# Patient Record
Sex: FEMALE | Race: WHITE
[De-identification: names, ages, dates, MRNs, and addresses within clinical notes are randomized per-mention and may not be internally consistent; named-entity substitution may affect disease eponyms.]

---

## 2019-07-31 ENCOUNTER — HOSPITAL ENCOUNTER (INPATIENT)
Dept: HOSPITAL 36 - ER | Age: 54
LOS: 2 days | Discharge: HOME | DRG: 249 | End: 2019-08-02
Attending: FAMILY MEDICINE | Admitting: FAMILY MEDICINE
Payer: COMMERCIAL

## 2019-07-31 VITALS — DIASTOLIC BLOOD PRESSURE: 67 MMHG | SYSTOLIC BLOOD PRESSURE: 122 MMHG

## 2019-07-31 DIAGNOSIS — E87.6: ICD-10-CM

## 2019-07-31 DIAGNOSIS — K56.600: ICD-10-CM

## 2019-07-31 DIAGNOSIS — E87.8: ICD-10-CM

## 2019-07-31 DIAGNOSIS — K70.9: ICD-10-CM

## 2019-07-31 DIAGNOSIS — F10.20: ICD-10-CM

## 2019-07-31 DIAGNOSIS — F32.9: ICD-10-CM

## 2019-07-31 DIAGNOSIS — K52.9: Primary | ICD-10-CM

## 2019-07-31 DIAGNOSIS — D64.9: ICD-10-CM

## 2019-07-31 DIAGNOSIS — N39.0: ICD-10-CM

## 2019-07-31 DIAGNOSIS — K74.60: ICD-10-CM

## 2019-07-31 DIAGNOSIS — F29: ICD-10-CM

## 2019-07-31 LAB
ALBUMIN SERPL-MCNC: 3.5 GM/DL (ref 3.7–5.3)
ALBUMIN/GLOB SERPL: 0.8 {RATIO} (ref 1–1.8)
ALP SERPL-CCNC: 147 U/L (ref 34–104)
ALT SERPL-CCNC: 23 U/L (ref 7–52)
AMPHET UR-MCNC: NEGATIVE NG/ML
AMYLASE SERPL-CCNC: 55 U/L (ref 29–103)
ANION GAP SERPL CALC-SCNC: 12.8 MMOL/L (ref 7–16)
APPEARANCE UR: CLEAR
AST SERPL-CCNC: 42 U/L (ref 13–39)
BACTERIA #/AREA URNS HPF: (no result) /HPF
BARBITURATES UR-MCNC: NEGATIVE UG/ML
BASOPHILS # BLD AUTO: 0 TH/CUMM (ref 0–0.2)
BASOPHILS NFR BLD: 0 % (ref 0–3)
BENZODIAZEPINES PNL UR: NEGATIVE
BILIRUB SERPL-MCNC: 1.8 MG/DL (ref 0.3–1)
BILIRUB UR-MCNC: NEGATIVE MG/DL
BUN SERPL-MCNC: 10 MG/DL (ref 7–25)
CALCIUM SERPL-MCNC: 9.3 MG/DL (ref 8.6–10.3)
CANNABINOIDS SERPL QL CFM: POSITIVE
CHLORIDE SERPL-SCNC: 101 MEQ/L (ref 98–107)
CHOLEST SERPL-MCNC: 94 MG/DL (ref ?–200)
CK SERPL-CCNC: 145 U/L (ref 30–223)
CO2 SERPL-SCNC: 25.3 MEQ/L (ref 21–31)
COCAINE METAB.OTHER UR-MCNC: NEGATIVE NG/ML
COLOR UR: YELLOW
CREAT SERPL-MCNC: 0.6 MG/DL (ref 0.6–1.2)
EOSINOPHIL # BLD AUTO: 0.1 TH/CMM (ref 0.1–0.4)
EOSINOPHIL NFR BLD: 1 % (ref 0–5)
EPI CELLS URNS QL MICRO: (no result) /LPF
ERYTHROCYTE [DISTWIDTH] IN BLOOD BY AUTOMATED COUNT: 17.3 % (ref 11.5–20)
GLOBULIN SER-MCNC: 4.5 GM/DL
GLUCOSE SERPL-MCNC: 133 MG/DL (ref 70–105)
GLUCOSE UR STRIP-MCNC: NEGATIVE MG/DL
HCT VFR BLD CALC: 36.5 % (ref 41–60)
HDLC SERPL-MCNC: 31 MG/DL (ref 23–92)
HGB BLD-MCNC: 11.5 GM/DL (ref 12–16)
INR PPP: 1.18 (ref 0.5–1.4)
KETONES UR STRIP-MCNC: NEGATIVE MG/DL
LEUKOCYTE ESTERASE UR-ACNC: (no result)
LIPASE SERPL-CCNC: 88 U/L (ref 11–82)
LYMPHOCYTE AB SER FC-ACNC: 0.9 TH/CMM (ref 1.5–3)
LYMPHOCYTES # BLD MANUAL: 26 % (ref 20–50)
MCH RBC QN AUTO: 23.6 PG (ref 27–31)
MCHC RBC AUTO-ENTMCNC: 31.5 PG (ref 28–36)
MCV RBC AUTO: 75 FL (ref 81–100)
METHADONE UR CFM-MCNC: NEGATIVE NG/ML
METHAMPHET UR QL: NEGATIVE
MICRO URNS: YES
MONOCYTES # BLD AUTO: 0.6 TH/CMM (ref 0.3–1)
MONOCYTES # BLD MANUAL: 14 % (ref 2–10)
NEUTROPHILS # BLD: 2.3 TH/CMM (ref 1.8–8)
NEUTROPHILS NFR BLD AUTO: 59 % (ref 40–80)
NEUTS BAND NFR BLD: 0 % (ref 0–10)
NITRITE UR QL STRIP: NEGATIVE
OPIATES UR QL: NEGATIVE
PCP UR-MCNC: NEGATIVE UG/L
PH UR STRIP: 8 [PH] (ref 4.6–8)
PLATELET # BLD: 210 TH/CMM (ref 150–400)
POTASSIUM SERPL-SCNC: 2.1 MEQ/L (ref 3.5–5.1)
PROT UR STRIP-MCNC: NEGATIVE MG/DL
RBC # BLD AUTO: 4.87 MIL/CMM (ref 3.8–5.1)
RBC # UR STRIP: NEGATIVE /UL
RBC #/AREA URNS HPF: (no result) /HPF (ref 0–5)
SODIUM SERPL-SCNC: 137 MEQ/L (ref 136–145)
SP GR UR STRIP: <= 1.005 (ref 1–1.03)
TRICYCLICS UR QL: NEGATIVE
TRIGL SERPL-MCNC: 44 MG/DL (ref ?–150)
URINALYSIS COMPLETE PNL UR: (no result)
UROBILINOGEN UR STRIP-ACNC: 1 E.U./DL (ref 0.2–1)
WBC # BLD AUTO: 3.9 TH/CMM (ref 4.8–10.8)
WBC #/AREA URNS HPF: (no result) /HPF (ref 0–5)

## 2019-07-31 PROCEDURE — C9113 INJ PANTOPRAZOLE SODIUM, VIA: HCPCS

## 2019-07-31 RX ADMIN — POTASSIUM CHLORIDE SCH MLS/HR: 14.9 INJECTION, SOLUTION INTRAVENOUS at 17:07

## 2019-07-31 RX ADMIN — POTASSIUM CHLORIDE SCH MLS/HR: 14.9 INJECTION, SOLUTION INTRAVENOUS at 21:35

## 2019-07-31 RX ADMIN — METRONIDAZOLE SCH MLS/HR: 500 INJECTION, SOLUTION INTRAVENOUS at 21:31

## 2019-07-31 RX ADMIN — POTASSIUM CHLORIDE, DEXTROSE MONOHYDRATE AND SODIUM CHLORIDE SCH MLS/HR: 150; 5; 450 INJECTION, SOLUTION INTRAVENOUS at 17:05

## 2019-07-31 NOTE — DIAGNOSTIC IMAGING REPORT
CT abdomen and pelvis with intravenous contrast



Indication: Abdominal pain, rule out appendicitis



Comparison: None,



Technique: Axial images were obtained from the lung bases to the

bilateral proximal femurs with IV contrast.  Coronal reconstructions

were made.  total DLP:     454,  CTDI9.5



FINDINGS: Exam is limited due to motion.



Hypoventilatory atelectatic changes of the lung bases are noted. 

Heterogeneous liver is noted with probable early cirrhotic changes. 

Subcentimeter low-density focus in the dome of liver is noted to small

too characterize.  The patient is status post cholecystectomy.  The

spleen measures 14 cm.  No focal lesions.  No focal pancreatic or

adrenal lesions.  



There is a 1 cm right renal cyst and additional low-density lesions too

small to characterize but suggestive of cysts.  Distended urinary

bladder is noted.  There is a copious stool throughout the course. 

There is slight diastases of the midline anterior abdominal wall with

broad-based mild protrusion of bowel loops.  Partially visualized

nondilated appendix is noted without evidence of appendicitis. 

Fluid-filled loops of small bowel are noted with air-fluid levels.  No

free fluid or free air.  Minimal atherosclerosis is noted.  Advanced

degenerative changes lower lumbar spine is noted.  There appears to have

been old right pubic bone fracture.



IMPRESSION:



Limited exam due to motion.  Partially visualized nondilated appendix is

noted.  No evidence of acute appendicitis.



Multiple fluid-filled loops of small bowel a few small air-fluid levels.

 Findings may be due to inflammatory infectious process/enteritis.



Distended urinary bladder.



Slight diastases of the midline anterior abdominal wall with slight

broad-based protrusion of bowel loops.



Irregularity and likely early cirrhotic changes of the liver, correlate

clinically.



Mild splenomegaly.  



Evidence of prior cholecystectomy.



Subcentimeter low-density lesion of the dome of liver too small to

characterize.



There appears to be have been old right pubic bone fracture.  Assessment

was limited due to motion.

## 2019-08-01 LAB
ALBUMIN SERPL-MCNC: 3 GM/DL (ref 3.7–5.3)
ALBUMIN/GLOB SERPL: 0.8 {RATIO} (ref 1–1.8)
ALP SERPL-CCNC: 122 U/L (ref 34–104)
ALT SERPL-CCNC: 19 U/L (ref 7–52)
ANION GAP SERPL CALC-SCNC: 10.4 MMOL/L (ref 7–16)
AST SERPL-CCNC: 34 U/L (ref 13–39)
BILIRUB SERPL-MCNC: 1.4 MG/DL (ref 0.3–1)
BUN SERPL-MCNC: 9 MG/DL (ref 7–25)
CALCIUM SERPL-MCNC: 9.2 MG/DL (ref 8.6–10.3)
CHLORIDE SERPL-SCNC: 112 MEQ/L (ref 98–107)
CO2 SERPL-SCNC: 22.2 MEQ/L (ref 21–31)
CREAT SERPL-MCNC: 0.5 MG/DL (ref 0.6–1.2)
GLOBULIN SER-MCNC: 3.9 GM/DL
GLUCOSE SERPL-MCNC: 113 MG/DL (ref 70–105)
MAGNESIUM SERPL-MCNC: 1.7 MG/DL (ref 1.9–2.7)
POTASSIUM SERPL-SCNC: 2.6 MEQ/L (ref 3.5–5.1)
SODIUM SERPL-SCNC: 142 MEQ/L (ref 136–145)

## 2019-08-01 RX ADMIN — METRONIDAZOLE SCH MLS/HR: 500 INJECTION, SOLUTION INTRAVENOUS at 05:29

## 2019-08-01 RX ADMIN — LACTULOSE SCH GM: 20 SOLUTION ORAL at 09:06

## 2019-08-01 RX ADMIN — LACTULOSE SCH GM: 20 SOLUTION ORAL at 12:59

## 2019-08-01 RX ADMIN — POTASSIUM CHLORIDE, DEXTROSE MONOHYDRATE AND SODIUM CHLORIDE SCH MLS/HR: 150; 5; 450 INJECTION, SOLUTION INTRAVENOUS at 16:25

## 2019-08-01 RX ADMIN — LACTULOSE SCH: 20 SOLUTION ORAL at 16:23

## 2019-08-01 RX ADMIN — POTASSIUM CHLORIDE, DEXTROSE MONOHYDRATE AND SODIUM CHLORIDE SCH MLS/HR: 150; 5; 450 INJECTION, SOLUTION INTRAVENOUS at 05:54

## 2019-08-01 RX ADMIN — METRONIDAZOLE SCH MLS/HR: 500 INJECTION, SOLUTION INTRAVENOUS at 21:03

## 2019-08-01 RX ADMIN — METRONIDAZOLE SCH MLS/HR: 500 INJECTION, SOLUTION INTRAVENOUS at 12:58

## 2019-08-01 RX ADMIN — LACTULOSE SCH GM: 20 SOLUTION ORAL at 21:03

## 2019-08-01 NOTE — INTERNAL MEDICINE PROG NOTE
Internal Medicine Subjective





- Subjective


Service Date: 19


Patient seen and examined:: with staff (SHE FEELS BETTER)


Patient is:: awake, verbal, in bed, talking, confused


Per staff patient has:: no adverse event





Internal Medicine Objective





- Results


Result Diagrams: 


 19 11:30





 19 16:00


Recent Labs: 


 Laboratory Last Values











WBC  3.9 Th/cmm (4.8-10.8)  L  19  11:30    


 


RBC  4.87 Mil/cmm (3.80-5.10)   19  11:30    


 


Hgb  11.5 gm/dL (12-16)  L  19  11:30    


 


Hct  36.5 % (41.0-60)  L  19  11:30    


 


MCV  75.0 fl ()  L  19  11:30    


 


MCH  23.6 pg (27.0-31.0)  L  19  11:30    


 


MCHC Differential  31.5 pg (28.0-36.0)   19  11:30    


 


RDW  17.3 % (11.5-20.0)   19  11:30    


 


Plt Count  210 Th/cmm (150-400)   19  11:30    


 


MPV  9.4 fl  19  11:30    


 


Add Manual Diff  YES   19  11:30    


 


Band Neutrophils %  0 % (0-10)   19  11:30    


 


Neutrophils (Manual)  59 % (40-80)   19  11:30    


 


Lymphocytes  26 % (20-50)   19  11:30    


 


Monocytes  14 % (2-10)  H  19  11:30    


 


Eosinophils  1 % (0-5)   19  11:30    


 


Basophils  0 % (0-3)   19  11:30    


 


Microcytosis  2+   19  11:30    


 


PT  12.2 SECONDS (9.5-11.5)  H  19  11:30    


 


INR  1.18  (0.5-1.4)   19  11:30    


 


Sodium  142 mEq/L (136-145)   19  05:20    


 


Potassium  3.3 mEq/L (3.5-5.1)  L  19  16:00    


 


Chloride  112 mEq/L ()  H  19  05:20    


 


Carbon Dioxide  22.2 mEq/L (21.0-31.0)   19  05:20    


 


Anion Gap  10.4  (7.0-16.0)   19  05:20    


 


BUN  9 mg/dL (7-25)   19  05:20    


 


Creatinine  0.5 mg/dL (0.6-1.2)  L  19  05:20    


 


Est GFR ( Amer)  > 60.0 ml/min (>90)   19  05:20    


 


Est GFR (Non-Af Amer)  > 60.0 ml/min  19  05:20    


 


BUN/Creatinine Ratio  18.0   19  05:20    


 


Glucose  113 mg/dL ()  H  19  05:20    


 


Whole Bld Lactic Acid  1.50 mmol/L (0.60-1.99)   19  11:30    


 


Calcium  9.2 mg/dL (8.6-10.3)   19  05:20    


 


Magnesium  1.7 mg/dL (1.9-2.7)  L  19  05:20    


 


Total Bilirubin  1.4 mg/dL (0.3-1.0)  H  19  05:20    


 


AST  34 U/L (13-39)   19  05:20    


 


ALT  19 U/L (7-52)   19  05:20    


 


Alkaline Phosphatase  122 U/L ()  H  19  05:20    


 


Ammonia  192 umol/L (16-53)  H  19  05:20    


 


Creatine Kinase  145 U/L ()   19  11:30    


 


Troponin I  < 0.01 ng/mL (0.01-0.05)  L  19  11:30    


 


B-Natriuretic Peptide  28.3 pg/mL (5.0-100.0)   19  11:30    


 


Total Protein  6.9 gm/dL (6.0-8.3)   19  05:20    


 


Albumin  3.0 gm/dL (3.7-5.3)  L  19  05:20    


 


Globulin  3.9 gm/dL  19  05:20    


 


Albumin/Globulin Ratio  0.8  (1.0-1.8)  L  19  05:20    


 


Triglycerides  44 mg/dL (<150)   19  11:30    


 


Cholesterol  94 mg/dL (<200)   19  11:30    


 


LDL Cholesterol Direct  64 mg/dL ()  L  19  11:30    


 


HDL Cholesterol  31 mg/dL (23-92)   19  11:30    


 


Amylase  55 U/L ()   19  11:30    


 


Lipase  88 U/L (11-82)  H  19  11:30    


 


Serum Pregnancy, Qual  NEGATIVE  (NEGATIVE)   19  11:30    


 


Urine Source  CLEAN C   19  13:30    


 


Urine Color  YELLOW   19  13:30    


 


Urine Clarity  CLEAR  (CLEAR)   19  13:30    


 


Urine pH  8.0  (4.6 - 8.0)   19  13:30    


 


Ur Specific Gravity  <= 1.005  (1.005-1.030)   19  13:30    


 


Urine Protein  NEGATIVE mg/dL (NEGATIVE)   19  13:30    


 


Urine Glucose (UA)  NEGATIVE mg/dL (NEGATIVE)   19  13:30    


 


Urine Ketones  NEGATIVE mg/dL (NEGATIVE)   19  13:30    


 


Urine Blood  NEGATIVE  (NEGATIVE)   19  13:30    


 


Urine Nitrate  NEGATIVE  (NEGATIVE)   19  13:30    


 


Urine Bilirubin  NEGATIVE  (NEGATIVE)   19  13:30    


 


Urine Urobilinogen  1.0 E.U./dL (0.2 - 1.0)   19  13:30    


 


Ur Leukocyte Esterase  SMALL  (NEGATIVE)  H  19  13:30    


 


Urine RBC  0-2 /hpf (0-5)   19  13:30    


 


Urine WBC  2-5 /hpf (0-5)   19  13:30    


 


Ur Epithelial Cells  FEW /lpf (FEW)   19  13:30    


 


Urine Bacteria  FEW /hpf (NONE SEEN)   19  13:30    


 


Urine Opiates Screen  NEGATIVE  (NEGATIVE)   19  13:30    


 


Urine Methadone Screen  NEGATIVE  (NEGATIVE)   19  13:30    


 


Ur Barbiturates Screen  NEGATIVE  (NEGATIVE)   19  13:30    


 


Ur Tricyclics Screen  NEGATIVE  (NEGATIVE)   19  13:30    


 


Ur Phencyclidine Scrn  NEGATIVE  (NEGATIVE)   19  13:30    


 


Amphetamines Screen  NEGATIVE  (NEGATIVE)   19  13:30    


 


U Methamphetamines Scrn  NEGATIVE  (NEGATIVE)   19  13:30    


 


U Benzodiazepines Scrn  NEGATIVE  (NEGATIVE)   19  13:30    


 


U Cocaine Metab Screen  NEGATIVE  (NEGATIVE)   19  13:30    


 


U Cannabinoids Screen  POSITIVE  (NEGATIVE)  H  19  13:30    


 


Ethyl Alcohol  < 10 mg/dL (0-10)   19  11:30    














- Physical Exam


Vitals and I&O: 


 Vital Signs











Temp  98.8 F   19 16:00


 


Pulse  87   19 16:00


 


Resp  20   19 16:00


 


BP  127/75   19 16:00


 


Pulse Ox  97   19 16:00








 Intake & Output











 19





 18:59 06:59 18:59


 


Intake Total 200 2500 1100


 


Balance 200 2500 1100


 


Weight (lbs) 72.575 kg 72.575 kg 72.575 kg


 


Intake:   


 


  Intake, IV Amount  1300 1100


 


    D5-0.45NS w/20 mEq KCL 1,  1000 1000





    000 ml @ 100 mls/hr IV .   





    Q10H SYLWIA Rx#:[id number]   


 


    KCL 20mEq/100mL Premix 20  100 





    meq In 100 ml @ 50 mls/   





    hr IV Q2H SYLWIA Rx#:   





    [id number]   


 


    metroNIDAZOLE 500mg/NS  200 100





    100mL 500 mg In 100 ml @   





    100 mls/hr IV Q8H SYLWIA Rx#   





    :[id number]   


 


  Oral 200 1200 


 


Other:   


 


  # Voids 1 5 


 


  # Bowel Movements  2 


 


  Stool Characteristics  Soft Soft





  Formed Formed


 


  Weight Source Bedscale Patient stated Patient stated











Active Medications: 


Current Medications





Potassium Chloride/Dextrose/Sod Cl (D5-0.45ns W/20 Meq Kcl)  1,000 mls @ 100 mls

/hr IV .Q10H SYLWIA


   Stop: 19 16:29


   Last Admin: 19 16:25 Dose:  100 mls/hr


Metronidazole (Flagyl)  500 mg in 100 mls @ 100 mls/hr IV Q8H SYLWIA


   Stop: 19 20:59


   Last Infusion: 19 13:55 Dose:  Infused


Lactulose (Cephulac)  15 gm PO QID SYLWIA


   Stop: 19 08:59


   Last Admin: 19 16:23 Dose:  Not Given


Lorazepam (Ativan)  1 mg PO Q4HR PRN; Protocol


   PRN Reason: Anxiety


   Stop: 19 06:31


Metoprolol Tartrate (Lopressor)  12.5 mg PO DAILY SYLWIA


   Stop: 19 08:59


   Last Admin: 19 09:07 Dose:  Not Given


Mupirocin (Bactroban Oint)  1 appl NS BID SYLWIA


   Stop: 19 09:01


Pantoprazole Sodium (Protonix)  40 mg IVP DAILY SYLWIA


   Stop: 19 08:59


   Last Admin: 19 09:02 Dose:  40 mg


Potassium Chloride (Klor-Con)  40 meq PO X1 ONE


   Stop: 19 17:11


Spironolactone (Aldactone)  100 mg PO DAILY SYLWIA


   Stop: 19 08:59


   Last Admin: 19 09:06 Dose:  Not Given








General: alert


HEENT: NC/AT, PERRLA, EOMI, anicteric sclerae, throat clear


Neck: Supple, No JVD, No thyromegaly, +2 carotid pulse wo bruit, No LAD


Cardiovascular: RRR, Normal S1, Normal S2, without murmur


Abdomen: non-tender, non-distended


Extremities: clear


Neurological: no change





Internal Medicine Assmt/Plan





- Assessment


Assessment: 





1.ACUTE GASTROENTRITIS.


2.ALCOHOL LIVER DISEASE.


3.PSYCHOSIS.


4.ELECTROLYTE IMBALANCE.





- Plan


Plan: 





CONTINUE ON CURRENT MEDICATION AND DIET.





Nutritional Asmnt/Malnutr-PDOC





- Dietary Evaluation


Malnutrition Findings (Please click <Entered> for more info): 








Nutritional Asmnt/Malnutrition                             Start:  19 14:

17


Text:                                                      Status: Complete    

  


Freq:                                                                          

  


Protocol:                                                                      

  


 Document     19 14:17  JOSEPH  (Rec: 19 14:23  JOSEPH [id number])


 Nutritional Asmnt/Malnutrition


     Patient General Information


      Nutritional Screening                      High Risk


      Diagnosis                                  Gastroenteritis


      Pertinent Medical Hx/Surgical Hx           HTN, PUD/GERD, Cirrhosis,


                                                 Alcohol liver disease,


                                                 Cholecystectomy


      Subjective Information                     Pt is a 53-year-old female


                                                 admitted on  c/o


                                                 intermittent, dull, diffuse,


                                                 crampy, abdominal pain, N/V/D


                                                 x 1 day. Pt PO intake 75%


                                                 dinner yesterday  and 0%


                                                 breakfast today per Meal/


                                                 Nutrition Activity Record.


                                                 Per RN Tuyet, N/V/Abdominal


                                                 Pain improved. Spoke to JOSE Lala, Pt slept through


                                                 breakfast but pt ate 25% lunch


                                                 today. Will continue monitor


                                                 on PO intake to nutrition


                                                 needs.


                                                 HT: 53


                                                 WT: 160 LB (72.73 kg)


                                                 ADJ BW: 60.5 kg


                                                 BMI: 28.34 (Overweight)


                                                 GI: WNL, Soft, Large


                                                 BM: 8/1 x 2


                                                 I/O: 2600/Not Noted


                                                 Skin: WNL, Pink, Intact


                                                 Mikal: 22


                                                 Diet Order: Clear liquid


                                                 Estimated Energy Needs: (


                                                 Overweight, ADJ BW)


                                                 8127-7501 kcals (20-25 kcals/


                                                 kg)


                                                 48-54 g Pro (0.8-0.9 g/kg)


                                                 5653-0716 ml (25-30 ml/kg)


      Current Diet Order/ Nutrition Support      Clear liquid


      Pertinent Medications                      Lopressor, Protonix, D5-0.4ns


                                                 w/20 Meq Kcl 1000mls @100mls/


                                                 hr IV Q10H


      Pertinent Labs                             : Potass 2.6, Cl 112, BUN/


                                                 Cr 9/0.5, Glucose 113, Mg 1.7,


                                                 Alb 3.0


                                                 : Hgb/Hct 11.5/36.5


     Nutritional Hx/Data


      Height                                     1.6 m


      Height (Calculated Centimeters)            160.0


      Current Weight (lbs)                       72.575 kg


      Weight (Calculated Kilograms)              72.6


      Weight (Calculated Grams)                  44595.8


      Ideal Body Weight                          115lb (52.4kg)


      % Ideal Body Weight                        139


      Body Mass Index (BMI)                      28.3


      Weight Status                              Overweight


     GI Symptoms


      GI Symptoms                                Nausea


                                                 Vomitting


      Last BM                                    8/1 x 2


      Skin Integrity/Comment:                    WNL, Pink, Intact


                                                 Mikal: 22


      Current %PO                                Poor (25-49%)


     Estimated Nutritional Goals


      BEE in Kcals:                              Adj wt of IBW


      Calories/Kcals/Kg                          20-25


      Kcals Calculated                           2130-2878


      Protein:                                   Adj wt of IBW


      Protein g/k.8-0.9


      Protein Calculated                         48-54


      Fluid: ml                                  4653-4369 ml (25-30 ml/kg)


     Nutritional Problem


      2. Problem


       Problem                                   Inadequate energy and protein


                                                 intake


       Etiology                                  r/t Gastroenteritis


       Signs/Symptoms:                           aeb clear liquid diet


      1. Problem


       Problem                                   Altered nutrition related labs


       Etiology                                  r/t medical condition


       Signs/Symptoms:                           aeb lab result Potass 2.6, Cl


                                                 112, BUN/Cr 9/0.5, Glucose 113


                                                 , Mg 1.7, Alb 3.0


     Intervention/Recommendation


      Comments                                   1. Continue with Clear liquid


                                                 diet as ordered.


                                                 2. RN to encourage PO intake.


     Expected Outcomes/Goals


      Expected Outcomes/Goals                    1. PO intake to meet 75% of


                                                 nutritional needs.


                                                 2. Monitor PO intake, wt, skin


                                                 integrity, and nutrition


                                                 related labs to trend WNL.


                                                 3. F/U as high risk in 2-3


                                                 days, 8/3-

## 2019-08-01 NOTE — CONSULTATION
DATE OF CONSULTATION:  08/01/2019



PSYCHIATRIC CONSULTATION



AGE:  53.



SEX:  Female.



PHYSICIAN:  Dr. Fischer.



CONSULTANT:  Dr. Guillen.



TYPE OF THE REPORT:  Psychiatric consult.



REASON FOR THE CONSULT:  Agitation and heavy drinking.



HISTORY OF PRESENT ILLNESS:  The patient is a 53-year-old female with history of

advanced liver cirrhosis.  The patient has been drinking and has been agitated

and restless.  Earlier prior to my evaluation, the patient was extremely

agitated and irritable and the patient had to be sedated and was given emergency

dose of Haldol.  I was not able to get much information from the patient, but

most of the information obtained from the chart.  The patient has a long history

of drinking, although she has liver cirrhosis yet she continues to drink.  She

has been restless and in irritable mood when arrived to the hospital.



PAST PSYCHIATRIC HISTORY:  The patient is not known history of psychiatric

issues, but she is currently not on any psychotropic medications.  The patient

does have a history of alcoholism.



PAST MEDICAL HISTORY:  Advanced liver cirrhosis.



SOCIAL HISTORY:  Not clear at that time.  The patient seems to be .



PRESENTING SYMPTOMS TO THE HOSPITAL:  The patient was presented to the hospital

with nausea and vomiting for 1 day, which most probably related to her drinking.



MENTAL STATUS EXAM:  The patient is sedated.  I tried to talk to the patient,

but difficulty to wake her up from her deep sleep because of her sedation.  The

patient did not answer question regarding hallucinations or delusions or

regarding suicide or homicide at this time because of her sedation.  Poor

insight and poor judgment



ASSESSMENT:

PRIMARY DIAGNOSIS:  Depressive mood disorder, unspecified.



SECONDARY DIAGNOSIS:  Alcohol use disorder.



TREATMENT PLAN:  Monitor the patient's behavior and condition closely.  Also, I

will give Ativan on a p.r.n. basis.  We will monitor her behavior closely.  We

will reevaluate when more alert.



Thanks to Dr. Fischer and we will follow with you.





DD: 08/01/2019 06:35

DT: 08/01/2019 07:20

[facility]# [id number]  [id number]

## 2019-08-01 NOTE — HISTORY & PHYSICAL
ADMIT DATE:  07/31/2019



CHIEF COMPLAINT:  Nausea, vomiting for 1 day duration.



HISTORY OF PRESENT ILLNESS:  The patient is a 53-year-old female with long

history of alcohol liver disease, presented to the Emergency Room with

intractable nausea, vomiting, evaluated by the ER physician.  Initial workup

significant for acute gastroenteritis, severe hypokalemia.  The patient had 40

mEq of KCl p.o. and 40 mEq IV, admit to the telemetry, started on IV fluid.  The

patient denies any fever, chills, diarrhea.



PAST MEDICAL HISTORY:  Significant for chronic liver disease.



PAST SURGICAL HISTORY:  No recent surgery.



ALLERGIES:  None.



MEDICATIONS:  Follow admission reconciliation.



SOCIAL HISTORY:  Chronic alcoholic, smokes marijuana.



FAMILY HISTORY:  Noncontributory.



REVIEW OF SYSTEMS:

RENAL SYSTEM:  No history of chronic renal disorder.

CARDIOVASCULAR SYSTEM:  No coronary artery disease.

ENDOCRINE SYSTEM:  No diabetes or thyroid problem.

GASTROINTESTINAL:  She has alcohol liver disease.

NEUROLOGICAL SYSTEM:  No seizure disorder.

SKELETOMUSCULAR SYSTEM:  No muscular dystrophy.

HEMATOLOGICAL SYSTEM:  No bleeding tendencies.

RESPIRATORY SYSTEMS:  No history of asthma.

GENITOURINARY:  No dysuria or hematuria.



PHYSICAL EXAMINATION:

GENERAL:  She is awake, alert, oriented.

VITAL SIGNS:  Temperature is 98.1, heart rate 84, blood pressure 125/84.

HEENT:  Normocephalic.  Pupils reactive to light and accommodation.  Sclerae

clear.

NECK:  Supple.  Negative for lymphadenopathy, JVD or bruit.

CHEST:  Bilaterally normal.  No rhonchi or wheezing.

HEART:  S1, S2.  No gallop rhythm.

ABDOMEN:  Soft, bowel sounds positive.

EXTREMITIES:  No edema.

NEUROLOGICAL:  She is awake, alert, oriented.  No focal motor sensory deficits. 

Cranial nerves 2-12 is intact.



LABORATORY DATA:  White blood cell 3.9, hemoglobin 11.5, hematocrit 36.5,

platelet is 210.  PT 12.2, INR 1.18.  Sodium 137, potassium 2.1, BUN 10,

creatinine 0.7.



ASSESSMENT:

1.  Acute gastroenteritis.

2.  History of alcohol liver disease.



PLAN:  The patient is admitted to the hospital under Dr. Fischer's service, IV

fluid, clear liquid diet, IV Flagyl, IV Protonix.  CBC, CMP for tomorrow.  The

patient is a full code.





DD: 07/31/2019 22:55

DT: 07/31/2019 23:21

JOB# [id number]  [id number]

## 2019-08-01 NOTE — CONSULTATION
DATE OF CONSULTATION:  08/01/2019



INPATIENT GASTROINTESTINAL CONSULTATION



CONSULTING PHYSICIAN:  Dr. Fischer.



REASON FOR CONSULTATION:  Liver cirrhosis, gastroenteritis, nausea and vomiting.



HISTORY OF PRESENT ILLNESS:  The patient is a 53-year-old female with past

medical history significant for alcoholic-related liver cirrhosis, continued

alcoholism who was admitted to the hospital with nausea and vomiting for one

day.  Apparently, the patient called 911 as she was profusely vomiting at home

and was brought into this Emergency Room for evaluation.  At this point, she is

unable to give me a history.  She does not want to talk to me at all and rather

sleep.  However, as per the nurses and the chart review, she was drinking

heavily, mostly vodka up to the point where she started having nausea and

vomiting yesterday.  She has a long history of alcoholism and a past medical

history with liver cirrhosis, although it is unknown if she is decompensated. 

Overnight, she has not had any further nausea and vomiting, but has been sedated

with pain medication and this is not really willing to answer any other

questions.



PAST MEDICAL HISTORY:  Liver cirrhosis, alcoholism.



PAST SURGICAL HISTORY:  Unknown.



FAMILY HISTORY:  Noncontributory.



SOCIAL HISTORY:  The patient is a heavy alcohol drinker including up to this

point.  She also uses marijuana as evidenced by her urine tox.  No history of

other illicit drugs.



ALLERGIES:  No known drug allergies.



REVIEW OF SYSTEMS:  Not possible given the patient is unable to participate in

the interview.



CURRENT MEDICATIONS:  Include lactulose, lorazepam, Lopressor, Flagyl, Protonix,

spironolactone.



PHYSICAL EXAMINATION:

VITAL SIGNS:  The blood pressure is 97/48, pulse 82 beats per minute,

respiratory rate of 17, temperature 98.8, oxygenation 97%.

GENERAL:  The patient is lying on her side.  She is alert and oriented x 2.  She

does not appear to be in acute distress.

HEENT:  Normocephalic, atraumatic appearing head.  Pupils are equal and

reactive.  Extraocular muscles appear to be intact.  There are dry mucous

membranes.

NECK:  Supple.  No JVD or thyromegaly.

CHEST:  Clear to auscultation bilaterally.

CARDIOVASCULAR:  S1, S2 are present, regular rate and rhythm.

ABDOMEN:  Soft.  There is mild distention.  No guarding, no rebound.  No fluid

distention.

EXTREMITIES:  1+ pitting edema bilaterally.  Pulses are not present.

SKIN:  There is no jaundice.  There are multiple tattoos.



LABORATORY DATA:  White blood cell count 3.9, hemoglobin is 11.5, platelet count

210.  The INR is 1.18.  Sodium is 142.  The BUN is 9, creatinine 0.5.  The total

bilirubin is 1.4, AST is 34, ALT is 19.  The ammonia level is 192, lipase 88. 

Tox screen shows positive for cannabinoid.  CT imaging and CT scan was done. 

This shows cirrhotic changes of the liver.  There are also multiple fluid filled

loops of small bowel, possible enteritis, subcentimeter low density lesion of

the liver dome too small to characterize and old right pubic fractures noted.



IMPRESSION:  This is a 53-year-old female with history of ongoing alcoholism and

liver cirrhosis, who was admitted to the hospital with nausea and vomiting.

1.  Nausea and vomiting.

2.  Liver cirrhosis.

3.  Ongoing alcoholism.

4.  Elevated liver function tests.



DISCUSSION:  This patient continues to drink alcohol and likely presents with

alcoholic hepatitis as well as a possible infectious enteritis syndrome, she is

no longer having nausea and vomiting.  Thus, I think this is going to be able to

be managed conservatively with antibiotics and supportive care.  In terms of her

liver cirrhosis, this will likely continue to become worse if she continues to

drink.  It is unknown how she is decompensated at this point, although her

ammonia level is certainly elevated that she may have an element of hepatic

encephalopathy.  The CT scan shows a subcentimeter density in the liver and this

should be followed with ultrasound in a few months, although I doubt the patient

has good followup or is willing to have a good followup given her alcoholism.



RECOMMENDATIONS:

1.  Supportive care.

2.  Continue to advance diet as tolerated.

3.  The patient should really try to stop drinking.  This is the most important

thing she can do for herself.

4.  Recommend ultrasound in 3 months' time to look at the density in the liver

to ensure stability.

5.  Lactulose is already ordered given the ammonia level is elevated.  This

should be continued as an outpatient as well.

6.  There is no ascites on the CT scan.  Thus I do not think a paracentesis is

needed.  She is on Aldactone, which is presumably an outpatient medication.  She

is not on any Lasix, which is likely due to the fact that her potassium is low. 

I will continue to follow the patient.



Thank you for allowing me to participate in her care.  Please call with any

questions.





DD: 08/01/2019 08:51

DT: 08/01/2019 20:17

JOB# [id number]  [id number]

## 2019-08-02 LAB
ALBUMIN SERPL-MCNC: 2.7 GM/DL (ref 3.7–5.3)
ALBUMIN/GLOB SERPL: 0.7 {RATIO} (ref 1–1.8)
ALP SERPL-CCNC: 106 U/L (ref 34–104)
ALT SERPL-CCNC: 17 U/L (ref 7–52)
ANION GAP SERPL CALC-SCNC: 10.3 MMOL/L (ref 7–16)
AST SERPL-CCNC: 33 U/L (ref 13–39)
BILIRUB SERPL-MCNC: 1.4 MG/DL (ref 0.3–1)
BUN SERPL-MCNC: 7 MG/DL (ref 7–25)
CALCIUM SERPL-MCNC: 9 MG/DL (ref 8.6–10.3)
CHLORIDE SERPL-SCNC: 117 MEQ/L (ref 98–107)
CO2 SERPL-SCNC: 16.9 MEQ/L (ref 21–31)
CREAT SERPL-MCNC: 0.6 MG/DL (ref 0.6–1.2)
GLOBULIN SER-MCNC: 3.7 GM/DL
GLUCOSE SERPL-MCNC: 104 MG/DL (ref 70–105)
POTASSIUM SERPL-SCNC: 3.2 MEQ/L (ref 3.5–5.1)
SODIUM SERPL-SCNC: 141 MEQ/L (ref 136–145)

## 2019-08-02 RX ADMIN — POTASSIUM CHLORIDE, DEXTROSE MONOHYDRATE AND SODIUM CHLORIDE SCH MLS/HR: 150; 5; 450 INJECTION, SOLUTION INTRAVENOUS at 02:42

## 2019-08-02 RX ADMIN — POTASSIUM CHLORIDE SCH MLS/HR: 14.9 INJECTION, SOLUTION INTRAVENOUS at 15:08

## 2019-08-02 RX ADMIN — LACTULOSE SCH GM: 20 SOLUTION ORAL at 16:32

## 2019-08-02 RX ADMIN — POTASSIUM CHLORIDE SCH MLS/HR: 14.9 INJECTION, SOLUTION INTRAVENOUS at 16:00

## 2019-08-02 RX ADMIN — POTASSIUM CHLORIDE, DEXTROSE MONOHYDRATE AND SODIUM CHLORIDE SCH MLS/HR: 150; 5; 450 INJECTION, SOLUTION INTRAVENOUS at 14:15

## 2019-08-02 RX ADMIN — METRONIDAZOLE SCH MLS/HR: 500 INJECTION, SOLUTION INTRAVENOUS at 04:25

## 2019-08-02 RX ADMIN — METRONIDAZOLE SCH MLS/HR: 500 INJECTION, SOLUTION INTRAVENOUS at 12:01

## 2019-08-02 RX ADMIN — LACTULOSE SCH GM: 20 SOLUTION ORAL at 12:01

## 2019-08-02 RX ADMIN — LACTULOSE SCH GM: 20 SOLUTION ORAL at 08:11

## 2019-08-02 NOTE — DISCHARGE SUMMARY
DATE OF DISCHARGE:  08/02/2019



FINAL DIAGNOSES:

1.  Acute gastroenteritis.

2.  Alcohol liver disease.

3.  Electrolyte imbalance.

4.  Psychosis.



REVIEW OF HISTORY:  The patient is a 53-year-old female with long history of

alcohol liver disease, presented to the Emergency Room with nausea, vomiting and

confusion.  Initial workup significant for acute gastroenteritis ____,

psychosis, admitted to the hospital, started IV fluid, antibiotic.



COURSE OF HOSPITALIZATION:  On second day of hospitalization, the patient was

feeling better, still confused.  No chest pain, no shortness of breath, no

nausea, no vomiting.



PHYSICAL EXAMINATION:

VITAL SIGNS:  On 08/02/2019, the patient is afebrile, temperature 98.1, heart

rate 79, blood pressure 114/69.

CHEST:  Clear to auscultation.

ABDOMEN:  Soft, bowel sounds positive.



LABORATORY DATA:  Sodium 141, potassium 3.2, BUN is 7, creatinine 0.6.



DISPOSITION:  The patient was discharged home.  Follow up with primary physician

next week.



CONDITION ON DISCHARGE:  Stable.



MEDICATIONS:  Follow discharge reconciliation.





DD: 08/02/2019 14:59

DT: 08/02/2019 18:08

[facility]# [id number]  [id number]

## 2019-08-02 NOTE — GI PROGRESS NOTE
Subjective





- Review of Systems


Service Date: 08/02/19


Subjective: 


Feeling better, no further vomiting





GI OBJECTIVE





- Results


Result Diagrams: 


 07/31/19 11:30





 08/02/19 05:00


Recent Labs: 


 Laboratory Last Values











WBC  3.9 Th/cmm (4.8-10.8)  L  07/31/19  11:30    


 


RBC  4.87 Mil/cmm (3.80-5.10)   07/31/19  11:30    


 


Hgb  11.5 gm/dL (12-16)  L  07/31/19  11:30    


 


Hct  36.5 % (41.0-60)  L  07/31/19  11:30    


 


MCV  75.0 fl ()  L  07/31/19  11:30    


 


MCH  23.6 pg (27.0-31.0)  L  07/31/19  11:30    


 


MCHC Differential  31.5 pg (28.0-36.0)   07/31/19  11:30    


 


RDW  17.3 % (11.5-20.0)   07/31/19  11:30    


 


Plt Count  210 Th/cmm (150-400)   07/31/19  11:30    


 


MPV  9.4 fl  07/31/19  11:30    


 


Add Manual Diff  YES   07/31/19  11:30    


 


Band Neutrophils %  0 % (0-10)   07/31/19  11:30    


 


Neutrophils (Manual)  59 % (40-80)   07/31/19  11:30    


 


Lymphocytes  26 % (20-50)   07/31/19  11:30    


 


Monocytes  14 % (2-10)  H  07/31/19  11:30    


 


Eosinophils  1 % (0-5)   07/31/19  11:30    


 


Basophils  0 % (0-3)   07/31/19  11:30    


 


Microcytosis  2+   07/31/19  11:30    


 


PT  12.2 SECONDS (9.5-11.5)  H  07/31/19  11:30    


 


INR  1.18  (0.5-1.4)   07/31/19  11:30    


 


Sodium  141 mEq/L (136-145)   08/02/19  05:00    


 


Potassium  3.2 mEq/L (3.5-5.1)  L  08/02/19  05:00    


 


Chloride  117 mEq/L ()  H  08/02/19  05:00    


 


Carbon Dioxide  16.9 mEq/L (21.0-31.0)  L  08/02/19  05:00    


 


Anion Gap  10.3  (7.0-16.0)   08/02/19  05:00    


 


BUN  7 mg/dL (7-25)   08/02/19  05:00    


 


Creatinine  0.6 mg/dL (0.6-1.2)   08/02/19  05:00    


 


Est GFR ( Amer)  > 60.0 ml/min (>90)   08/02/19  05:00    


 


Est GFR (Non-Af Amer)  > 60.0 ml/min  08/02/19  05:00    


 


BUN/Creatinine Ratio  11.7   08/02/19  05:00    


 


Glucose  104 mg/dL ()   08/02/19  05:00    


 


Whole Bld Lactic Acid  1.50 mmol/L (0.60-1.99)   07/31/19  11:30    


 


Calcium  9.0 mg/dL (8.6-10.3)   08/02/19  05:00    


 


Magnesium  1.7 mg/dL (1.9-2.7)  L  08/01/19  05:20    


 


Total Bilirubin  1.4 mg/dL (0.3-1.0)  H  08/02/19  05:00    


 


AST  33 U/L (13-39)   08/02/19  05:00    


 


ALT  17 U/L (7-52)   08/02/19  05:00    


 


Alkaline Phosphatase  106 U/L ()  H  08/02/19  05:00    


 


Ammonia  192 umol/L (16-53)  H  08/01/19  05:20    


 


Creatine Kinase  145 U/L ()   07/31/19  11:30    


 


Troponin I  < 0.01 ng/mL (0.01-0.05)  L  07/31/19  11:30    


 


B-Natriuretic Peptide  28.3 pg/mL (5.0-100.0)   07/31/19  11:30    


 


Total Protein  6.4 gm/dL (6.0-8.3)   08/02/19  05:00    


 


Albumin  2.7 gm/dL (3.7-5.3)  L  08/02/19  05:00    


 


Globulin  3.7 gm/dL  08/02/19  05:00    


 


Albumin/Globulin Ratio  0.7  (1.0-1.8)  L  08/02/19  05:00    


 


Triglycerides  44 mg/dL (<150)   07/31/19  11:30    


 


Cholesterol  94 mg/dL (<200)   07/31/19  11:30    


 


LDL Cholesterol Direct  64 mg/dL ()  L  07/31/19  11:30    


 


HDL Cholesterol  31 mg/dL (23-92)   07/31/19  11:30    


 


Amylase  55 U/L ()   07/31/19  11:30    


 


Lipase  88 U/L (11-82)  H  07/31/19  11:30    


 


Serum Pregnancy, Qual  NEGATIVE  (NEGATIVE)   07/31/19  11:30    


 


Urine Source  CLEAN C   07/31/19  13:30    


 


Urine Color  YELLOW   07/31/19  13:30    


 


Urine Clarity  CLEAR  (CLEAR)   07/31/19  13:30    


 


Urine pH  8.0  (4.6 - 8.0)   07/31/19  13:30    


 


Ur Specific Gravity  <= 1.005  (1.005-1.030)   07/31/19  13:30    


 


Urine Protein  NEGATIVE mg/dL (NEGATIVE)   07/31/19  13:30    


 


Urine Glucose (UA)  NEGATIVE mg/dL (NEGATIVE)   07/31/19  13:30    


 


Urine Ketones  NEGATIVE mg/dL (NEGATIVE)   07/31/19  13:30    


 


Urine Blood  NEGATIVE  (NEGATIVE)   07/31/19  13:30    


 


Urine Nitrate  NEGATIVE  (NEGATIVE)   07/31/19  13:30    


 


Urine Bilirubin  NEGATIVE  (NEGATIVE)   07/31/19  13:30    


 


Urine Urobilinogen  1.0 E.U./dL (0.2 - 1.0)   07/31/19  13:30    


 


Ur Leukocyte Esterase  SMALL  (NEGATIVE)  H  07/31/19  13:30    


 


Urine RBC  0-2 /hpf (0-5)   07/31/19  13:30    


 


Urine WBC  2-5 /hpf (0-5)   07/31/19  13:30    


 


Ur Epithelial Cells  FEW /lpf (FEW)   07/31/19  13:30    


 


Urine Bacteria  FEW /hpf (NONE SEEN)   07/31/19  13:30    


 


Urine Opiates Screen  NEGATIVE  (NEGATIVE)   07/31/19  13:30    


 


Urine Methadone Screen  NEGATIVE  (NEGATIVE)   07/31/19  13:30    


 


Ur Barbiturates Screen  NEGATIVE  (NEGATIVE)   07/31/19  13:30    


 


Ur Tricyclics Screen  NEGATIVE  (NEGATIVE)   07/31/19  13:30    


 


Ur Phencyclidine Scrn  NEGATIVE  (NEGATIVE)   07/31/19  13:30    


 


Amphetamines Screen  NEGATIVE  (NEGATIVE)   07/31/19  13:30    


 


U Methamphetamines Scrn  NEGATIVE  (NEGATIVE)   07/31/19  13:30    


 


U Benzodiazepines Scrn  NEGATIVE  (NEGATIVE)   07/31/19  13:30    


 


U Cocaine Metab Screen  NEGATIVE  (NEGATIVE)   07/31/19  13:30    


 


U Cannabinoids Screen  POSITIVE  (NEGATIVE)  H  07/31/19  13:30    


 


Ethyl Alcohol  < 10 mg/dL (0-10)   07/31/19  11:30    














- Physical Exam


Vitals and I&O: 


 Vital Signs











Temp  99.3 F   08/02/19 04:00


 


Pulse  93   08/02/19 04:00


 


Resp  20   08/02/19 04:00


 


BP  102/54   08/02/19 04:00


 


Pulse Ox  97   08/02/19 04:00








 Intake & Output











 08/01/19 08/02/19 08/02/19





 18:59 06:59 18:59


 


Intake Total 1100 2160 


 


Output Total 3  


 


Balance 1097 2160 


 


Weight (lbs) 72.575 kg 75.478 kg 


 


Intake:   


 


  Intake, IV Amount 1100 1200 


 


    D5-0.45NS w/20 mEq KCL 1, 1000 1000 





    000 ml @ 100 mls/hr IV .   





    Q10H [facility] Rx#:[id number]   


 


    metroNIDAZOLE 500mg/ 200 





    100mL 500 mg In 100 ml @   





    100 mls/hr IV Q8H [facility] Rx#   





    :[id number]   


 


  Oral  960 


 


Output:   


 


  Urine 3  


 


Other:   


 


  # Voids  5 


 


  # Bowel Movements 1 0 


 


  Stool Characteristics Soft Soft 





 Formed Formed 


 


  Weight Source Bedscale Bedscale 











Active Medications: 


Current Medications





Potassium Chloride/Dextrose/Sod Cl (D5-0.45ns W/20 Meq Kcl)  1,000 mls @ 100 mls

/hr IV .Q10H [facility]


   Stop: 09/29/19 16:29


   Last Admin: 08/02/19 02:42 Dose:  100 mls/hr


Metronidazole (Flagyl)  500 mg in 100 mls @ 100 mls/hr IV Q8H [facility]


   Stop: 09/29/19 20:59


   Last Infusion: 08/02/19 05:25 Dose:  Infused


Lactulose (Cephulac)  15 gm PO QID [facility]


   Stop: 09/30/19 08:59


   Last Admin: 08/01/19 21:03 Dose:  15 gm


Lorazepam (Ativan)  1 mg PO Q4HR PRN; Protocol


   PRN Reason: Anxiety


   Stop: 09/30/19 06:31


Metoprolol Tartrate (Lopressor)  12.5 mg PO DAILY [facility]


   Stop: 09/30/19 08:59


   Last Admin: 08/01/19 09:07 Dose:  Not Given


Mupirocin (Bactroban Oint)  1 appl NS BID [facility]


   Stop: 08/06/19 09:01


   Last Admin: 08/01/19 18:56 Dose:  1 appl


Pantoprazole Sodium (Protonix)  40 mg IVP DAILY [facility]


   Stop: 09/30/19 08:59


   Last Admin: 08/01/19 09:02 Dose:  40 mg


Spironolactone (Aldactone)  100 mg PO DAILY [facility]


   Stop: 09/30/19 08:59


   Last Admin: 08/01/19 09:06 Dose:  Not Given








General: Alert, Oriented x3


HEENT: Atraumatic


Cardiovascular: Regular rate


Abdomen: Bowel sounds, Soft, Hepatomegaly, no Tender, no Splenomegaly, no 

Distended, no Rebound, no Mass





Assessment/Plan





- Problem List


Patient Problems: 


All Active Problems





GENERALIZED ABDOMINAL PAIN WITH N/V (Acute) 











- Assessment


Assessment: 





# Alcoholism


# Cirrhosis, MELD 10


# Nausea/vomiting





Initial vomiting may be related to a viral enteritis, although alcoholic 

hepatitis or alcohol toxicity could certainly be responsible. MELD score is 10. 

US shows sub cm density in the liver, which should be re-imaged in 3 months








Plan:





- stop EtOH


- US due in 3 months to look at the small spot there, ensure no growth


- trend LFTs


- advance diet, low salt


- supportive measures


- EGD due as outpt for variceal screening